# Patient Record
Sex: MALE | Race: BLACK OR AFRICAN AMERICAN | Employment: PART TIME | URBAN - METROPOLITAN AREA
[De-identification: names, ages, dates, MRNs, and addresses within clinical notes are randomized per-mention and may not be internally consistent; named-entity substitution may affect disease eponyms.]

---

## 2022-12-12 ENCOUNTER — HOSPITAL ENCOUNTER (EMERGENCY)
Age: 27
Discharge: HOME | End: 2022-12-12
Attending: EMERGENCY MEDICINE
Payer: MEDICAID

## 2022-12-12 ENCOUNTER — APPOINTMENT (OUTPATIENT)
Dept: ULTRASOUND IMAGING | Age: 27
End: 2022-12-12
Payer: MEDICAID

## 2022-12-12 VITALS
HEIGHT: 78 IN | DIASTOLIC BLOOD PRESSURE: 96 MMHG | HEART RATE: 74 BPM | OXYGEN SATURATION: 95 % | TEMPERATURE: 98.7 F | RESPIRATION RATE: 17 BRPM | BODY MASS INDEX: 30.66 KG/M2 | WEIGHT: 265 LBS | SYSTOLIC BLOOD PRESSURE: 153 MMHG

## 2022-12-12 DIAGNOSIS — R10.9 FLANK PAIN: Primary | ICD-10-CM

## 2022-12-12 DIAGNOSIS — Z86.19 HISTORY OF HEPATITIS A: ICD-10-CM

## 2022-12-12 DIAGNOSIS — G89.29 CHRONIC ABDOMINAL PAIN: ICD-10-CM

## 2022-12-12 DIAGNOSIS — R10.9 CHRONIC ABDOMINAL PAIN: ICD-10-CM

## 2022-12-12 LAB
A/G RATIO: 1.4 (ref 1.1–2.2)
ALBUMIN SERPL-MCNC: 4.6 G/DL (ref 3.4–5)
ALP BLD-CCNC: 105 U/L (ref 40–129)
ALT SERPL-CCNC: 33 U/L (ref 10–40)
ANION GAP SERPL CALCULATED.3IONS-SCNC: 9 MMOL/L (ref 3–16)
AST SERPL-CCNC: 30 U/L (ref 15–37)
BASOPHILS ABSOLUTE: 0.1 K/UL (ref 0–0.2)
BASOPHILS RELATIVE PERCENT: 1.1 %
BILIRUB SERPL-MCNC: 0.3 MG/DL (ref 0–1)
BILIRUBIN URINE: NEGATIVE
BLOOD, URINE: NEGATIVE
BUN BLDV-MCNC: 13 MG/DL (ref 7–20)
CALCIUM SERPL-MCNC: 9.4 MG/DL (ref 8.3–10.6)
CHLORIDE BLD-SCNC: 101 MMOL/L (ref 99–110)
CLARITY: CLEAR
CO2: 27 MMOL/L (ref 21–32)
COLOR: YELLOW
CREAT SERPL-MCNC: 1.2 MG/DL (ref 0.9–1.3)
EOSINOPHILS ABSOLUTE: 0.1 K/UL (ref 0–0.6)
EOSINOPHILS RELATIVE PERCENT: 2.1 %
GFR SERPL CREATININE-BSD FRML MDRD: >60 ML/MIN/{1.73_M2}
GLUCOSE BLD-MCNC: 98 MG/DL (ref 70–99)
GLUCOSE URINE: NEGATIVE MG/DL
HCT VFR BLD CALC: 48.5 % (ref 40.5–52.5)
HEMOGLOBIN: 16.7 G/DL (ref 13.5–17.5)
KETONES, URINE: ABNORMAL MG/DL
LEUKOCYTE ESTERASE, URINE: NEGATIVE
LIPASE: 39 U/L (ref 13–60)
LYMPHOCYTES ABSOLUTE: 2.9 K/UL (ref 1–5.1)
LYMPHOCYTES RELATIVE PERCENT: 52.9 %
MCH RBC QN AUTO: 31.1 PG (ref 26–34)
MCHC RBC AUTO-ENTMCNC: 34.3 G/DL (ref 31–36)
MCV RBC AUTO: 90.5 FL (ref 80–100)
MICROSCOPIC EXAMINATION: ABNORMAL
MONOCYTES ABSOLUTE: 0.3 K/UL (ref 0–1.3)
MONOCYTES RELATIVE PERCENT: 5.9 %
NEUTROPHILS ABSOLUTE: 2.1 K/UL (ref 1.7–7.7)
NEUTROPHILS RELATIVE PERCENT: 38 %
NITRITE, URINE: NEGATIVE
PDW BLD-RTO: 13.3 % (ref 12.4–15.4)
PH UA: 7 (ref 5–8)
PLATELET # BLD: 203 K/UL (ref 135–450)
PMV BLD AUTO: 8.5 FL (ref 5–10.5)
POTASSIUM SERPL-SCNC: 5 MMOL/L (ref 3.5–5.1)
PROTEIN UA: NEGATIVE MG/DL
RBC # BLD: 5.36 M/UL (ref 4.2–5.9)
SODIUM BLD-SCNC: 137 MMOL/L (ref 136–145)
SPECIFIC GRAVITY UA: 1.02 (ref 1–1.03)
TOTAL PROTEIN: 7.9 G/DL (ref 6.4–8.2)
URINE REFLEX TO CULTURE: ABNORMAL
URINE TYPE: ABNORMAL
UROBILINOGEN, URINE: 1 E.U./DL
WBC # BLD: 5.5 K/UL (ref 4–11)

## 2022-12-12 PROCEDURE — 76705 ECHO EXAM OF ABDOMEN: CPT

## 2022-12-12 PROCEDURE — 81003 URINALYSIS AUTO W/O SCOPE: CPT

## 2022-12-12 PROCEDURE — 80053 COMPREHEN METABOLIC PANEL: CPT

## 2022-12-12 PROCEDURE — 99284 EMERGENCY DEPT VISIT MOD MDM: CPT

## 2022-12-12 PROCEDURE — 83690 ASSAY OF LIPASE: CPT

## 2022-12-12 PROCEDURE — 6370000000 HC RX 637 (ALT 250 FOR IP): Performed by: PHYSICIAN ASSISTANT

## 2022-12-12 PROCEDURE — 85025 COMPLETE CBC W/AUTO DIFF WBC: CPT

## 2022-12-12 RX ORDER — DOCUSATE SODIUM 100 MG/1
100 CAPSULE, LIQUID FILLED ORAL 2 TIMES DAILY
Qty: 60 CAPSULE | Refills: 0 | Status: SHIPPED | OUTPATIENT
Start: 2022-12-12 | End: 2023-01-11

## 2022-12-12 RX ORDER — NAPROXEN 500 MG/1
500 TABLET ORAL 2 TIMES DAILY WITH MEALS
Qty: 20 TABLET | Refills: 0 | Status: SHIPPED | OUTPATIENT
Start: 2022-12-12 | End: 2022-12-22

## 2022-12-12 RX ORDER — IBUPROFEN 800 MG/1
800 TABLET ORAL ONCE
Status: COMPLETED | OUTPATIENT
Start: 2022-12-12 | End: 2022-12-12

## 2022-12-12 RX ORDER — POLYETHYLENE GLYCOL 3350 17 G/17G
17 POWDER, FOR SOLUTION ORAL DAILY
Qty: 255 G | Refills: 1 | Status: SHIPPED | OUTPATIENT
Start: 2022-12-12 | End: 2023-01-11

## 2022-12-12 RX ADMIN — IBUPROFEN 800 MG: 800 TABLET, FILM COATED ORAL at 19:53

## 2022-12-12 ASSESSMENT — PAIN SCALES - GENERAL: PAINLEVEL_OUTOF10: 9

## 2022-12-12 ASSESSMENT — PAIN DESCRIPTION - LOCATION: LOCATION: FLANK

## 2022-12-12 ASSESSMENT — LIFESTYLE VARIABLES
HOW OFTEN DO YOU HAVE A DRINK CONTAINING ALCOHOL: NEVER
HOW MANY STANDARD DRINKS CONTAINING ALCOHOL DO YOU HAVE ON A TYPICAL DAY: PATIENT DOES NOT DRINK

## 2022-12-12 ASSESSMENT — PAIN - FUNCTIONAL ASSESSMENT: PAIN_FUNCTIONAL_ASSESSMENT: 0-10

## 2022-12-12 ASSESSMENT — PAIN DESCRIPTION - ORIENTATION: ORIENTATION: RIGHT

## 2022-12-12 NOTE — Clinical Note
Yeni Givens was seen and treated in our emergency department on 12/12/2022. He may return to work on 12/14/2022. If you have any questions or concerns, please don't hesitate to call.       Lakeisha Ward, DO

## 2022-12-12 NOTE — Clinical Note
Kristen Steele was seen and treated in our emergency department on 12/12/2022. He may return to work on 12/14/2022. If you have any questions or concerns, please don't hesitate to call.       Radha Ward, DO

## 2022-12-13 ASSESSMENT — ENCOUNTER SYMPTOMS
COUGH: 0
RHINORRHEA: 0
VOMITING: 0
NAUSEA: 0
DIARRHEA: 0
SHORTNESS OF BREATH: 0
ABDOMINAL PAIN: 1

## 2022-12-13 NOTE — ED PROVIDER NOTES
[facility]        Pt Name: Sherryle Rimes  MRN: [id number]  [place] 1995  Date of evaluation: 12/12/2022  Provider: Osito Iyer PA-C  PCP: Daniela Linn MD  Note Started: 12:01 AM EST 12/13/22           I have seen and evaluated this patient with my supervising physician No att. providers found. CHIEF COMPLAINT       Chief Complaint   Patient presents with    Flank Pain     R sided, radiating into back; patient reports this has been going on for 6 months; when asked what changed tonight, pt responded \"pain got too bad\"; pain rated 9/10       HISTORY OF PRESENT ILLNESS   (Location, Timing/Onset, Context/Setting, Quality, Duration, Modifying Factors, Severity, Associated Signs and Symptoms)  Note limiting factors. Chief Complaint: Right upper quadrant abdominal pain/right flank pain    Niles Ward is a 33 y.o. male who presents to the emergency department today for evaluation for right upper quadrant abdominal pain/right flank pain. Patient states that symptoms have been ongoing for the past 6 months. Patient states that he has had daily pain for the past 6 months, and states that he actually was referred to a primary care physician. Patient states he did have blood work drawn over 2 months ago, however he was not told the results. The patient states that his pain is currently rated as a 9/10 however when I enter the room he is sitting in bed comfortably and in no acute distress looking at his cell phone. Patient otherwise denies any known alleviating or aggravating factors. Patient has no nausea, vomiting or diarrhea. He has no fever or chills. No cough or congestion. He has no chest pain or shortness of breath. He denies any urinary symptoms. He does not take Tylenol.   He reports occasional alcohol use, denies any IV drug use no other complaints    Nursing Notes were all reviewed and agreed with or 1915]   BP Temp Temp Source Heart Rate Resp SpO2 Height Weight   (!) 126/111 98.9 °F (37.2 °C) Oral 88 16 98 % 6' 6\" (1.981 m) 265 lb (120.2 kg)       Physical Exam  Vitals and nursing note reviewed. Constitutional:       Appearance: He is well-developed. He is not diaphoretic. HENT:      Head: Normocephalic and atraumatic. Right Ear: External ear normal.      Left Ear: External ear normal.      Nose: Nose normal.   Eyes:      General:         Right eye: No discharge. Left eye: No discharge. Neck:      Trachea: No tracheal deviation. Cardiovascular:      Rate and Rhythm: Normal rate and regular rhythm. Heart sounds: No murmur heard. Pulmonary:      Effort: Pulmonary effort is normal. No respiratory distress. Breath sounds: Normal breath sounds. No wheezing or rales. Abdominal:      General: Bowel sounds are normal. There is no distension. Palpations: Abdomen is soft. Tenderness: There is abdominal tenderness. There is no right CVA tenderness, left CVA tenderness, guarding or rebound. Comments: Minimal tenderness noted to the right upper quadrant, extending towards right flank. No other flank tenderness, CVA tenderness. No rebound or guarding. No peritoneal signs. Musculoskeletal:         General: Normal range of motion. Cervical back: Normal range of motion and neck supple. Skin:     General: Skin is warm and dry. Neurological:      Mental Status: He is alert and oriented to person, place, and time. Psychiatric:         Behavior: Behavior normal.       DIAGNOSTIC RESULTS   LABS:    Labs Reviewed   URINALYSIS WITH REFLEX TO CULTURE - Abnormal; Notable for the following components:       Result Value    Ketones, Urine TRACE (*)     All other components within normal limits   CBC WITH AUTO DIFFERENTIAL   COMPREHENSIVE METABOLIC PANEL   LIPASE       When ordered only abnormal lab results are displayed.  All other labs were within normal range or not returned as of this dictation. EKG: When ordered, EKG's are interpreted by the Emergency Department Physician in the absence of a cardiologist.  Please see their note for interpretation of EKG. RADIOLOGY:   Non-plain film images such as CT, Ultrasound and MRI are read by the radiologist. Plain radiographic images are visualized and preliminarily interpreted by the ED Provider with the below findings:        Interpretation per the Radiologist below, if available at the time of this note:    [address]   Final Result   No cholelithiasis. No biliary dilation. No evidence of acute cholecystitis. US GALLBLADDER RUQ    Result Date: 12/12/2022  EXAMINATION: RIGHT UPPER QUADRANT ULTRASOUND 12/12/2022 8:38 pm COMPARISON: 03/26/2022 HISTORY: ORDERING SYSTEM PROVIDED HISTORY: RUQ PAIN TECHNOLOGIST PROVIDED HISTORY: Reason for exam:->RUQ PAIN FINDINGS: LIVER:  The liver demonstrates normal echogenicity without evidence of intrahepatic biliary ductal dilatation. BILIARY SYSTEM:  Gallbladder is unremarkable without evidence of pericholecystic fluid, wall thickening or stones. Negative sonographic Otero's sign. Common bile duct is within normal limits measuring 5 mm. RIGHT KIDNEY: The right kidney is grossly unremarkable without evidence of hydronephrosis. PANCREAS:  Visualized portions of the pancreas are unremarkable. OTHER: No evidence of right upper quadrant ascites. No cholelithiasis. No biliary dilation. No evidence of acute cholecystitis.            PROCEDURES   Unless otherwise noted below, none     Procedures    CRITICAL CARE TIME       CONSULTS:  None      EMERGENCY DEPARTMENT COURSE and DIFFERENTIAL DIAGNOSIS/MDM:   Vitals:    Vitals:    12/12/22 1915 12/12/22 1937   BP: (!) 126/111 (!) 153/96   Pulse: 88 74   Resp: 16 17   Temp: 98.9 °F (37.2 °C) 98.7 °F (37.1 °C)   TempSrc: Oral    SpO2: 98% 95%   Weight: 265 lb (120.2 kg)    Height: 6' 6\" (1.981 m)        Patient was given the following medications:  Medications   ibuprofen (ADVIL;MOTRIN) tablet 800 mg (800 mg Oral Given 12/12/22 1953)         Is this patient to be included in the SEP-1 Core Measure due to severe sepsis or septic shock? No   Exclusion criteria - the patient is NOT to be included for SEP-1 Core Measure due to: Infection is not suspected    Briefly, this is a 81-year-old male who presents emergency department today for evaluation for right upper quadrant abdominal pain. Patient states that this has been ongoing for the past 6 months. Patient actually had lab work drawn in September, and was noted to be positive for hepatitis C. Patient states that he has had the same pain for the past several months. No other complaints or symptoms. On exam he does have tenderness noted to the right upper quadrant, extending to the right flank, there is no rebound or guarding, there are no peritoneal signs. CBC shows no evidence of leukocytosis or anemia. CMP is unremarkable, lipase is normal.  Urine shows no evidence of infection or blood. Ultrasound shows no acute process. Patient symptoms today are chronic in nature and have been ongoing for over 6 months, he does test positive for hepatitis C 2 months ago however this is otherwise self-limiting and he otherwise has no other complaints or symptoms, and I do not feel that he needs any further emergent work-up at this time and can be managed as an outpatient. He will be referred to GI for follow-up within the next week. He is to return to the ED for any new or worsening symptoms, patient was understanding is agreeable plan. Able for discharge.    Results for orders placed or performed during the hospital encounter of 12/12/22   CBC with Auto Differential   Result Value Ref Range    WBC 5.5 4.0 - 11.0 K/uL    RBC 5.36 4.20 - 5.90 M/uL    Hemoglobin 16.7 13.5 - 17.5 g/dL    Hematocrit 48.5 40.5 - 52.5 %    MCV 90.5 80.0 - 100.0 fL    MCH 31.1 26.0 - 34.0 pg    MCHC 34.3 31.0 - 36.0 g/dL    RDW 13.3 12.4 - 15.4 %    Platelets 333 484 - 432 K/uL    MPV 8.5 5.0 - 10.5 fL    Neutrophils % 38.0 %    Lymphocytes % 52.9 %    Monocytes % 5.9 %    Eosinophils % 2.1 %    Basophils % 1.1 %    Neutrophils Absolute 2.1 1.7 - 7.7 K/uL    Lymphocytes Absolute 2.9 1.0 - 5.1 K/uL    Monocytes Absolute 0.3 0.0 - 1.3 K/uL    Eosinophils Absolute 0.1 0.0 - 0.6 K/uL    Basophils Absolute 0.1 0.0 - 0.2 K/uL   Comprehensive Metabolic Panel   Result Value Ref Range    Sodium 137 136 - 145 mmol/L    Potassium 5.0 3.5 - 5.1 mmol/L    Chloride 101 99 - 110 mmol/L    CO2 27 21 - 32 mmol/L    Anion Gap 9 3 - 16    Glucose 98 70 - 99 mg/dL    BUN 13 7 - 20 mg/dL    Creatinine 1.2 0.9 - 1.3 mg/dL    Est, Glom Filt Rate >60 >60    Calcium 9.4 8.3 - 10.6 mg/dL    Total Protein 7.9 6.4 - 8.2 g/dL    Albumin 4.6 3.4 - 5.0 g/dL    Albumin/Globulin Ratio 1.4 1.1 - 2.2    Total Bilirubin 0.3 0.0 - 1.0 mg/dL    Alkaline Phosphatase 105 40 - 129 U/L    ALT 33 10 - 40 U/L    AST 30 15 - 37 U/L   Lipase   Result Value Ref Range    Lipase 39.0 13.0 - 60.0 U/L   Urinalysis with Reflex to Culture    Specimen: Urine   Result Value Ref Range    Color, UA Yellow Straw/Yellow    Clarity, UA Clear Clear    Glucose, Ur Negative Negative mg/dL    Bilirubin Urine Negative Negative    Ketones, Urine TRACE (A) Negative mg/dL    Specific Gravity, UA 1.020 1.005 - 1.030    Blood, Urine Negative Negative    pH, UA 7.0 5.0 - 8.0    Protein, UA Negative Negative mg/dL    Urobilinogen, Urine 1.0 <2.0 E.U./dL    Nitrite, Urine Negative Negative    Leukocyte Esterase, Urine Negative Negative    Microscopic Examination Not Indicated     Urine Type NotGiven     Urine Reflex to Culture Not Indicated          I estimate there is LOW risk acute appendicitis, acute cholecystitis, cholangitis, pancreatitis, diverticulitis, perforated viscus, perforated ulcer, colitis, C. diff colitis, ischemic colitis, bowel obstruction, acute dissection, thus I consider the discharge disposition reasonable. Also, there is no evidence or peritonitis, sepsis, or toxicity. Mary Boyce and I have discussed the diagnosis and risks, and we agree with discharging home to follow-up with their primary doctor. We also discussed returning to the Emergency Department immediately if new or worsening symptoms occur. We have discussed the symptoms which are most concerning (e.g., bloody stool, fever, changing or worsening pain, vomiting) that necessitate immediate return. FINAL IMPRESSION      1. Flank pain    2. History of hepatitis A    3.  Chronic abdominal pain          DISPOSITION/PLAN   DISPOSITION Decision To Discharge 12/12/2022 10:19:52 PM      PATIENT REFERRED TO:  [address]  [facility]    Schedule an appointment as soon as possible for a visit in 1 week      [facility] Emergency Department  [address] [facility]  [phone]    As needed, If symptoms worsen    Funmilayo Menendez MD  [address] [place] [address]  [phone]    In 2 days        DISCHARGE MEDICATIONS:  Discharge Medication List as of 12/12/2022 10:32 PM        START taking these medications    Details   naproxen (NAPROSYN) 500 MG tablet Take 1 tablet by mouth 2 times daily (with meals) for 10 days, Disp-20 tablet, R-0Print      polyethylene glycol (GLYCOLAX) 17 GM/SCOOP powder Take 17 g by mouth daily, Disp-255 g, R-1Print      docusate sodium (COLACE) 100 MG capsule Take 1 capsule by mouth 2 times daily, Disp-60 capsule, R-0Print             DISCONTINUED MEDICATIONS:  Discharge Medication List as of 12/12/2022 10:32 PM        STOP taking these medications       meloxicam (MOBIC) 7.5 MG tablet Comments:   Reason for Stopping:                      (Please note that portions of this note were completed with a voice recognition program.  Efforts were made to edit the dictations but occasionally words are

## 2022-12-15 NOTE — ED PROVIDER NOTES
In addition to the advanced practice provider, I personally saw Joanne Perez and performed a substantive portion of the visit including all aspects of the medical decision making. Briefly, this is a 33 y.o. male here for upper abdominal and right flank pain ongoing for the past 6 months. Nothing acutely makes the pain any better or worse, however states pain became more severe today. On exam, patient afebrile and nontoxic. No distress. Heart RRR. Lungs CTAB. Abdomen soft, nondistended, mild tenderness palpation in right upper quadrant and epigastrium. Negative Otero. No lower abdominal tenderness. No rebound, no rigidity, no guarding. No CVA tenderness. Screenings   [place] Coma Scale  Eye Opening: Spontaneous  Best Verbal Response: Oriented  Best Motor Response: Obeys commands  [place] Coma Scale Score: 15      Is this patient to be included in the SEP-1 Core Measure due to severe sepsis or septic shock? No   Exclusion criteria - the patient is NOT to be included for SEP-1 Core Measure due to: Infection is not suspected      MDM    Patient afebrile and nontoxic. No distress. No peritoneal signs on abdominal exam, no focal findings to suggest acute appendicitis and my clinical suspicion is very low. Laboratory work-up is reassuring without evidence of endorgan dysfunction or clinically significant electrolyte derangement. No leukocytosis. Urinalysis bland. Very low suspicion clinically for ureteral stone. Right upper quadrant ultrasound demonstrates no acute abnormalities, no findings to suggest acute cholecystitis. Patient's pain ongoing for months, felt safe for discharge to self-care with close PCP follow-up. Patient is agreeable with plan and feels comfortable returning for home. Work excuse provided as per request.  Strict immediate return precautions were discussed. I Dr. Manuel Martin am the primary clinician of record. Patient Referrals:   [address] [place] [address]  Place  [address] [facility] [address]    Schedule an appointment as soon as possible for a visit in 1 week      [facility] Emergency Department  [address] [facility]  [phone]    As needed, If symptoms worsen    Melisa Albert MD  [address] [facility]  [phone]    In 2 days        Discharge Medications:  Discharge Medication List as of 12/12/2022 10:32 PM        START taking these medications    Details   naproxen (NAPROSYN) 500 MG tablet Take 1 tablet by mouth 2 times daily (with meals) for 10 days, Disp-20 tablet, R-0Print      polyethylene glycol (GLYCOLAX) 17 GM/SCOOP powder Take 17 g by mouth daily, Disp-255 g, R-1Print      docusate sodium (COLACE) 100 MG capsule Take 1 capsule by mouth 2 times daily, Disp-60 capsule, R-0Print             FINAL IMPRESSION  1. Flank pain    2. History of hepatitis A    3. Chronic abdominal pain        Blood pressure (!) 153/96, pulse 74, temperature 98.7 °F (37.1 °C), resp. rate 17, height 6' 6\" (1.981 m), weight 265 lb (120.2 kg), SpO2 95 %. For further details of [facility] emergency department encounter, please see documentation by advanced practice provider, MISHEL Carr.     Anna Ibanez DO (electronically signed)  Attending Emergency Physician       Anna Ibanez DO  12/15/22 [id number]

## 2023-01-20 ENCOUNTER — OFFICE VISIT (OUTPATIENT)
Dept: INTERNAL MEDICINE CLINIC | Age: 28
End: 2023-01-20

## 2023-01-20 VITALS
BODY MASS INDEX: 32.21 KG/M2 | HEIGHT: 78 IN | WEIGHT: 278.4 LBS | OXYGEN SATURATION: 97 % | DIASTOLIC BLOOD PRESSURE: 74 MMHG | SYSTOLIC BLOOD PRESSURE: 128 MMHG | HEART RATE: 68 BPM

## 2023-01-20 DIAGNOSIS — R73.9 HYPERGLYCEMIA: ICD-10-CM

## 2023-01-20 DIAGNOSIS — Z00.00 PREVENTATIVE HEALTH CARE: ICD-10-CM

## 2023-01-20 DIAGNOSIS — Z00.00 PREVENTATIVE HEALTH CARE: Primary | ICD-10-CM

## 2023-01-20 DIAGNOSIS — R10.13 DYSPEPSIA: ICD-10-CM

## 2023-01-20 DIAGNOSIS — Z13.220 SCREENING FOR CHOLESTEROL LEVEL: ICD-10-CM

## 2023-01-20 LAB
A/G RATIO: 1.5 (ref 1.1–2.2)
ALBUMIN SERPL-MCNC: 4.6 G/DL (ref 3.4–5)
ALP BLD-CCNC: 107 U/L (ref 40–129)
ALT SERPL-CCNC: 57 U/L (ref 10–40)
ANION GAP SERPL CALCULATED.3IONS-SCNC: 14 MMOL/L (ref 3–16)
AST SERPL-CCNC: 36 U/L (ref 15–37)
BASOPHILS ABSOLUTE: 0 K/UL (ref 0–0.2)
BASOPHILS RELATIVE PERCENT: 0.9 %
BILIRUB SERPL-MCNC: 0.3 MG/DL (ref 0–1)
BUN BLDV-MCNC: 11 MG/DL (ref 7–20)
CALCIUM SERPL-MCNC: 9.7 MG/DL (ref 8.3–10.6)
CHLORIDE BLD-SCNC: 102 MMOL/L (ref 99–110)
CHOLESTEROL, TOTAL: 205 MG/DL (ref 0–199)
CO2: 25 MMOL/L (ref 21–32)
CREAT SERPL-MCNC: 1 MG/DL (ref 0.9–1.3)
EOSINOPHILS ABSOLUTE: 0.1 K/UL (ref 0–0.6)
EOSINOPHILS RELATIVE PERCENT: 3 %
GFR SERPL CREATININE-BSD FRML MDRD: >60 ML/MIN/{1.73_M2}
GLUCOSE BLD-MCNC: 102 MG/DL (ref 70–99)
HCT VFR BLD CALC: 49.1 % (ref 40.5–52.5)
HDLC SERPL-MCNC: 42 MG/DL (ref 40–60)
HEMOGLOBIN: 16.4 G/DL (ref 13.5–17.5)
LDL CHOLESTEROL CALCULATED: 135 MG/DL
LYMPHOCYTES ABSOLUTE: 2 K/UL (ref 1–5.1)
LYMPHOCYTES RELATIVE PERCENT: 55 %
MCH RBC QN AUTO: 30.7 PG (ref 26–34)
MCHC RBC AUTO-ENTMCNC: 33.5 G/DL (ref 31–36)
MCV RBC AUTO: 91.5 FL (ref 80–100)
MONOCYTES ABSOLUTE: 0.2 K/UL (ref 0–1.3)
MONOCYTES RELATIVE PERCENT: 5.9 %
NEUTROPHILS ABSOLUTE: 1.3 K/UL (ref 1.7–7.7)
NEUTROPHILS RELATIVE PERCENT: 35.2 %
PDW BLD-RTO: 13.5 % (ref 12.4–15.4)
PLATELET # BLD: 197 K/UL (ref 135–450)
PMV BLD AUTO: 9.5 FL (ref 5–10.5)
POTASSIUM SERPL-SCNC: 4.4 MMOL/L (ref 3.5–5.1)
RBC # BLD: 5.36 M/UL (ref 4.2–5.9)
SODIUM BLD-SCNC: 141 MMOL/L (ref 136–145)
TOTAL PROTEIN: 7.6 G/DL (ref 6.4–8.2)
TRIGL SERPL-MCNC: 142 MG/DL (ref 0–150)
VLDLC SERPL CALC-MCNC: 28 MG/DL
WBC # BLD: 3.7 K/UL (ref 4–11)

## 2023-01-20 RX ORDER — OMEPRAZOLE 40 MG/1
40 CAPSULE, DELAYED RELEASE ORAL
Qty: 30 CAPSULE | Refills: 2 | Status: SHIPPED | OUTPATIENT
Start: 2023-01-20

## 2023-01-20 ASSESSMENT — ENCOUNTER SYMPTOMS
CHEST TIGHTNESS: 0
DIARRHEA: 0
VOMITING: 0
SHORTNESS OF BREATH: 0
SINUS PAIN: 0
BLOOD IN STOOL: 0
WHEEZING: 0
COLOR CHANGE: 0
CONSTIPATION: 0
NAUSEA: 0
BELCHING: 0
ABDOMINAL PAIN: 1
COUGH: 0

## 2023-01-20 ASSESSMENT — PATIENT HEALTH QUESTIONNAIRE - PHQ9
2. FEELING DOWN, DEPRESSED OR HOPELESS: 0
SUM OF ALL RESPONSES TO PHQ QUESTIONS 1-9: 0
1. LITTLE INTEREST OR PLEASURE IN DOING THINGS: 0
SUM OF ALL RESPONSES TO PHQ QUESTIONS 1-9: 0
SUM OF ALL RESPONSES TO PHQ QUESTIONS 1-9: 0
SUM OF ALL RESPONSES TO PHQ9 QUESTIONS 1 & 2: 0
SUM OF ALL RESPONSES TO PHQ QUESTIONS 1-9: 0

## 2023-01-20 NOTE — PROGRESS NOTES
Niles Joseph (:  1995) is a 33 y.o. male,Established patient, here for evaluation of the following chief complaint(s):  Abdominal Pain (R side )         ASSESSMENT/PLAN:  1. Preventative health care  -     CBC with Auto Differential; Future  2. Dyspepsia  -     omeprazole (PRILOSEC) 40 MG delayed release capsule; Take 1 capsule by mouth every morning (before breakfast), Disp-30 capsule, R-2Normal  -     H. Pylori Breath Test, Adult; Future  -     [facility] - Isidoro Barrientos MD, Gastroenterology (ERCP & EUS), [facility]  -     CBC with Auto Differential; Future  -     Comprehensive Metabolic Panel; Future  3. Screening for cholesterol level  -     Lipid Panel; Future  4. Hyperglycemia  -     Hemoglobin A1C; Future  Reviewed with patient his presentation again he is having abdominal pain again in the right upper quadrant is in the evaluation 6 months ago did not show any abnormality on the ultrasound and he did respond well to the acid suppressant after few days of using it but once he was done with the medicine and stopped it the pain was dormant until recently when he started acting up again patient denies using any over-the-counter meds pain medication and he drinks occasionally only    At this time    Restart the patient on an acid suppressant check him for H. pylori and refer him to have an upper endoscopy for further evaluation  The standpoint the patient did have borderline sugar we will get a check his A1c we will get a check his cholesterol again today and otherwise encouraged him to be active and eat healthy and monitor him clinically  Return in about 6 weeks (around 3/3/2023).          Subjective   SUBJECTIVE/OBJECTIVE:    Lab Review   Lab Results   Component Value Date/Time     2022 07:52 PM     2022 11:49 AM     2022 09:56 AM    K 5.0 2022 07:52 PM    K 4.3 2022 11:49 AM    K 4.3 2022 09:56 AM    K 4.1 2021 12:18 PM    CO2 27 12/12/2022 07:52 PM    CO2 27 09/23/2022 11:49 AM    CO2 28 07/27/2022 09:56 AM    BUN 13 12/12/2022 07:52 PM    BUN 9 09/23/2022 11:49 AM    BUN 10 07/27/2022 09:56 AM    CREATININE 1.2 12/12/2022 07:52 PM    CREATININE 1.0 09/23/2022 11:49 AM    CREATININE 0.9 07/27/2022 09:56 AM    GLUCOSE 98 12/12/2022 07:52 PM    GLUCOSE 116 09/23/2022 11:49 AM    GLUCOSE 100 07/27/2022 09:56 AM    CALCIUM 9.4 12/12/2022 07:52 PM    CALCIUM 9.3 09/23/2022 11:49 AM    CALCIUM 9.5 07/27/2022 09:56 AM     Lab Results   Component Value Date/Time    WBC 5.5 12/12/2022 07:52 PM    WBC 2.7 07/27/2022 09:56 AM    WBC 3.5 03/26/2022 11:05 AM    HGB 16.7 12/12/2022 07:52 PM    HGB 15.4 07/27/2022 09:56 AM    HGB 15.1 03/26/2022 11:05 AM    HCT 48.5 12/12/2022 07:52 PM    HCT 44.7 07/27/2022 09:56 AM    HCT 44.7 03/26/2022 11:05 AM    MCV 90.5 12/12/2022 07:52 PM    MCV 90.0 07/27/2022 09:56 AM    MCV 89.9 03/26/2022 11:05 AM     12/12/2022 07:52 PM     07/27/2022 09:56 AM     03/26/2022 11:05 AM     No results found for: CHOL, TRIG, HDL, LDLDIRECT    Vitals 1/20/2023 12/12/2022 55/31/3104   SYSTOLIC 134 557 291   DIASTOLIC 74 96 495   Pulse 68 74 88   Temp - 98.7 98.9   Resp - 17 16   SpO2 97 95 98   Weight 278 lb 6.4 oz - 265 lb   Height 6' 6\" - 6' 6\"   Body mass index 32.17 kg/m2 - 30.62 kg/m2   Pain Level - 9 -   Some recent data might be hidden       Abdominal Pain  This is a recurrent problem. The current episode started more than 1 month ago. The pain is located in the RUQ. The pain is at a severity of 6/10. Associated symptoms include dysuria. Pertinent negatives include no anorexia, arthralgias, belching, constipation, diarrhea, frequency, headaches, melena, myalgias, nausea or vomiting. Review of Systems   Constitutional:  Negative for activity change, appetite change, fatigue and unexpected weight change. HENT:  Negative for congestion, ear pain and sinus pain.     Respiratory:  Negative for cough, chest tightness, shortness of breath and wheezing. Cardiovascular:  Negative for chest pain and palpitations. Gastrointestinal:  Positive for abdominal pain. Negative for anorexia, blood in stool, constipation, diarrhea, melena, nausea and vomiting. Endocrine: Negative for cold intolerance, heat intolerance and polyuria. Genitourinary:  Positive for dysuria. Negative for frequency and urgency. Musculoskeletal:  Negative for arthralgias and myalgias. Skin:  Negative for color change and rash. Neurological:  Negative for weakness and headaches. Hematological:  Negative for adenopathy. Does not bruise/bleed easily. Psychiatric/Behavioral:  Negative for agitation, dysphoric mood and sleep disturbance. Objective   Physical Exam  Vitals and nursing note reviewed. Constitutional:       General: He is not in acute distress. Appearance: Normal appearance. HENT:      Head: Normocephalic and atraumatic. Right Ear: Tympanic membrane normal.      Left Ear: Tympanic membrane normal.      Nose: Nose normal.   Eyes:      Extraocular Movements: Extraocular movements intact. Conjunctiva/sclera: Conjunctivae normal.      Pupils: Pupils are equal, round, and reactive to light. Neck:      Vascular: No carotid bruit. Cardiovascular:      Rate and Rhythm: Normal rate and regular rhythm. Pulses: Normal pulses. Heart sounds: No murmur heard. Pulmonary:      Effort: Pulmonary effort is normal. No respiratory distress. Breath sounds: Normal breath sounds. Abdominal:      General: Abdomen is flat. Bowel sounds are normal. There is no distension. Palpations: Abdomen is soft. Tenderness: There is no abdominal tenderness. Musculoskeletal:         General: No swelling, tenderness or deformity. Cervical back: Normal range of motion and neck supple. No rigidity or tenderness. Right lower leg: No edema. Left lower leg: No edema.    Lymphadenopathy: Cervical: No cervical adenopathy. Skin:     Coloration: Skin is not jaundiced. Findings: No bruising, erythema or lesion. Neurological:      General: No focal deficit present. Mental Status: He is alert and oriented to person, place, and time. Cranial Nerves: No cranial nerve deficit. Motor: No weakness. Gait: Gait normal.          This dictation was generated by voice recognition computer software. Although all attempts are made to edit the dictation for accuracy, there may be errors in the transcription that are not intended. An electronic signature was used to authenticate this note.     --Mandi Vazquez MD

## 2023-01-21 LAB
ESTIMATED AVERAGE GLUCOSE: 111.2 MG/DL
HBA1C MFR BLD: 5.5 %

## 2023-11-13 ENCOUNTER — HOSPITAL ENCOUNTER (EMERGENCY)
Age: 28
Discharge: HOME | End: 2023-11-13
Attending: EMERGENCY MEDICINE
Payer: MEDICAID

## 2023-11-13 ENCOUNTER — APPOINTMENT (OUTPATIENT)
Dept: CT IMAGING | Age: 28
End: 2023-11-13
Payer: MEDICAID

## 2023-11-13 VITALS
OXYGEN SATURATION: 97 % | HEIGHT: 78 IN | DIASTOLIC BLOOD PRESSURE: 84 MMHG | BODY MASS INDEX: 30.08 KG/M2 | HEART RATE: 70 BPM | TEMPERATURE: 99.1 F | RESPIRATION RATE: 16 BRPM | SYSTOLIC BLOOD PRESSURE: 134 MMHG | WEIGHT: 260 LBS

## 2023-11-13 DIAGNOSIS — R10.31 RIGHT LOWER QUADRANT ABDOMINAL PAIN: Primary | ICD-10-CM

## 2023-11-13 LAB
ALBUMIN SERPL-MCNC: 4.5 G/DL (ref 3.4–5)
ALBUMIN/GLOB SERPL: 1.5 {RATIO} (ref 1.1–2.2)
ALP SERPL-CCNC: 88 U/L (ref 40–129)
ALT SERPL-CCNC: 41 U/L (ref 10–40)
ANION GAP SERPL CALCULATED.3IONS-SCNC: 8 MMOL/L (ref 3–16)
AST SERPL-CCNC: 27 U/L (ref 15–37)
BASOPHILS # BLD: 0.1 K/UL (ref 0–0.2)
BASOPHILS NFR BLD: 1 %
BILIRUB SERPL-MCNC: 0.3 MG/DL (ref 0–1)
BILIRUB UR QL STRIP.AUTO: NEGATIVE
BUN SERPL-MCNC: 11 MG/DL (ref 7–20)
CALCIUM SERPL-MCNC: 9.5 MG/DL (ref 8.3–10.6)
CHLORIDE SERPL-SCNC: 101 MMOL/L (ref 99–110)
CLARITY UR: CLEAR
CO2 SERPL-SCNC: 27 MMOL/L (ref 21–32)
COLOR UR: YELLOW
CREAT SERPL-MCNC: 1.2 MG/DL (ref 0.9–1.3)
DEPRECATED RDW RBC AUTO: 13.9 % (ref 12.4–15.4)
EOSINOPHIL # BLD: 0.3 K/UL (ref 0–0.6)
EOSINOPHIL NFR BLD: 6.5 %
GFR SERPLBLD CREATININE-BSD FMLA CKD-EPI: >60 ML/MIN/{1.73_M2}
GLUCOSE SERPL-MCNC: 104 MG/DL (ref 70–99)
GLUCOSE UR STRIP.AUTO-MCNC: NEGATIVE MG/DL
HCT VFR BLD AUTO: 47 % (ref 40.5–52.5)
HGB BLD-MCNC: 15.6 G/DL (ref 13.5–17.5)
HGB UR QL STRIP.AUTO: NEGATIVE
KETONES UR STRIP.AUTO-MCNC: NEGATIVE MG/DL
LACTATE BLDV-SCNC: 1.7 MMOL/L (ref 0.4–2)
LEUKOCYTE ESTERASE UR QL STRIP.AUTO: NEGATIVE
LIPASE SERPL-CCNC: 44 U/L (ref 13–60)
LYMPHOCYTES # BLD: 2.2 K/UL (ref 1–5.1)
LYMPHOCYTES NFR BLD: 44.9 %
MCH RBC QN AUTO: 30.6 PG (ref 26–34)
MCHC RBC AUTO-ENTMCNC: 33.3 G/DL (ref 31–36)
MCV RBC AUTO: 92 FL (ref 80–100)
MONOCYTES # BLD: 0.4 K/UL (ref 0–1.3)
MONOCYTES NFR BLD: 8.1 %
NEUTROPHILS # BLD: 1.9 K/UL (ref 1.7–7.7)
NEUTROPHILS NFR BLD: 39.5 %
NITRITE UR QL STRIP.AUTO: NEGATIVE
PH UR STRIP.AUTO: 6.5 [PH] (ref 5–8)
PLATELET # BLD AUTO: 230 K/UL (ref 135–450)
PLATELET BLD QL SMEAR: ADEQUATE
PMV BLD AUTO: 9.1 FL (ref 5–10.5)
POTASSIUM SERPL-SCNC: 4.4 MMOL/L (ref 3.5–5.1)
PROT SERPL-MCNC: 7.5 G/DL (ref 6.4–8.2)
PROT UR STRIP.AUTO-MCNC: NEGATIVE MG/DL
RBC # BLD AUTO: 5.11 M/UL (ref 4.2–5.9)
SLIDE REVIEW: NORMAL
SODIUM SERPL-SCNC: 136 MMOL/L (ref 136–145)
SP GR UR STRIP.AUTO: 1.02 (ref 1–1.03)
UA COMPLETE W REFLEX CULTURE PNL UR: NORMAL
UA DIPSTICK W REFLEX MICRO PNL UR: NORMAL
URN SPEC COLLECT METH UR: NORMAL
UROBILINOGEN UR STRIP-ACNC: 1 E.U./DL
WBC # BLD AUTO: 4.8 K/UL (ref 4–11)

## 2023-11-13 PROCEDURE — 99285 EMERGENCY DEPT VISIT HI MDM: CPT

## 2023-11-13 PROCEDURE — 80053 COMPREHEN METABOLIC PANEL: CPT

## 2023-11-13 PROCEDURE — 85025 COMPLETE CBC W/AUTO DIFF WBC: CPT

## 2023-11-13 PROCEDURE — 6360000004 HC RX CONTRAST MEDICATION: Performed by: PHYSICIAN ASSISTANT

## 2023-11-13 PROCEDURE — 83690 ASSAY OF LIPASE: CPT

## 2023-11-13 PROCEDURE — 81003 URINALYSIS AUTO W/O SCOPE: CPT

## 2023-11-13 PROCEDURE — 83605 ASSAY OF LACTIC ACID: CPT

## 2023-11-13 PROCEDURE — 6370000000 HC RX 637 (ALT 250 FOR IP): Performed by: PHYSICIAN ASSISTANT

## 2023-11-13 PROCEDURE — 74177 CT ABD & PELVIS W/CONTRAST: CPT

## 2023-11-13 RX ORDER — PANTOPRAZOLE SODIUM 40 MG/1
40 TABLET, DELAYED RELEASE ORAL ONCE
Status: COMPLETED | OUTPATIENT
Start: 2023-11-13 | End: 2023-11-13

## 2023-11-13 RX ORDER — ONDANSETRON 4 MG/1
4 TABLET, ORALLY DISINTEGRATING ORAL ONCE
Status: COMPLETED | OUTPATIENT
Start: 2023-11-13 | End: 2023-11-13

## 2023-11-13 RX ORDER — DICYCLOMINE HYDROCHLORIDE 10 MG/1
10 CAPSULE ORAL ONCE
Status: COMPLETED | OUTPATIENT
Start: 2023-11-13 | End: 2023-11-13

## 2023-11-13 RX ADMIN — PANTOPRAZOLE SODIUM 40 MG: 40 TABLET, DELAYED RELEASE ORAL at 16:52

## 2023-11-13 RX ADMIN — ONDANSETRON 4 MG: 4 TABLET, ORALLY DISINTEGRATING ORAL at 16:52

## 2023-11-13 RX ADMIN — DICYCLOMINE HYDROCHLORIDE 10 MG: 10 CAPSULE ORAL at 16:52

## 2023-11-13 RX ADMIN — IOPAMIDOL 75 ML: 755 INJECTION, SOLUTION INTRAVENOUS at 17:51

## 2023-11-13 ASSESSMENT — ENCOUNTER SYMPTOMS
BACK PAIN: 0
COLOR CHANGE: 0
COUGH: 0
CHEST TIGHTNESS: 0
SHORTNESS OF BREATH: 0
ABDOMINAL PAIN: 1
DIARRHEA: 0
ABDOMINAL DISTENTION: 0
RESPIRATORY NEGATIVE: 1
CONSTIPATION: 0
NAUSEA: 0
VOMITING: 0

## 2023-11-13 ASSESSMENT — PAIN - FUNCTIONAL ASSESSMENT: PAIN_FUNCTIONAL_ASSESSMENT: 0-10

## 2023-11-13 ASSESSMENT — PAIN DESCRIPTION - LOCATION: LOCATION: ABDOMEN

## 2023-11-13 ASSESSMENT — PAIN DESCRIPTION - DESCRIPTORS: DESCRIPTORS: DISCOMFORT

## 2023-11-13 ASSESSMENT — PAIN SCALES - GENERAL: PAINLEVEL_OUTOF10: 4

## 2023-11-13 NOTE — ED PROVIDER NOTES
[facility]        Pt Name: Matt Leon  MRN: [id number]  [address] [place] 1995  Date of evaluation: 11/13/2023  Provider: MISHEL Win  PCP: Kendy Sultana MD  Note Started: 6:22 PM EST 11/13/23       I have seen and evaluated this patient with my supervising physician Sonu Wylie MD.      [address]       Chief Complaint   Patient presents with    Abdominal Pain     Abdominal pain x 2 years. Has seen GI but they found nothing. HISTORY OF PRESENT ILLNESS: 1 or more Elements     History From: Patient  Limitations to history : None    Niles Dias is a 33 y.o. male with no significant past medical history who presents ED with complaint of abdominal pain. He complains of right-sided abdominal pain that he reports of ongoing intermittently for the past 2 years. He states he has had extensive work-up in the past including evaluation by GI/EGD with no definitive etiology to his symptoms. He is concerned because over the past 2 days he feels like symptoms have worsened. He rates pain as a 4/10 to his right lower abdomen. He denies nausea, vomiting, urinary symptoms or changes in bowel movements. He denies any testicular pain or swelling. Nuys any penile discharge. He denies fever or chills. Denies any rashes or lesions. Denies chest pain, shortness of breath or cough. He denies any decreased oral intake. Denies any surgeries to his abdomen in the past.    Nursing Notes were all reviewed and agreed with or any disagreements were addressed in the HPI. REVIEW OF SYSTEMS :      Review of Systems   Constitutional:  Negative for activity change, appetite change, chills, diaphoresis, fatigue and fever. Respiratory: Negative. Negative for cough, chest tightness and shortness of breath. Cardiovascular: Negative. Negative for chest pain, palpitations and leg swelling.    Gastrointestinal:

## 2023-11-14 NOTE — DISCHARGE INSTRUCTIONS
Take Tylenol Motrin for pain. I have given you a referral to Dr. Vidya Gonzales our gastroenterologist.  Call tomorrow for an appointment.

## 2023-11-14 NOTE — ED PROVIDER NOTES
This patient was seen by the Mid-Level Provider. I have seen and examined the patient, agree with the workup, evaluation, management and diagnosis. Care plan has been discussed. My assessment reveals a 81-year-old male who presents with abdominal pain. This is a very pleasant 81-year-old male who presents with right lower quadrant abdominal pain he has had for the last several years. He states it has been worse over the last several days. He denies any bowel or bladder complaints. He denies any fevers or chills. The patient apparently has seen a GI doctor in the past and has received an upper GI. He has not had a colonoscopy per the patient. Radiology results:    CT ABDOMEN PELVIS W IV CONTRAST Additional Contrast? None   Preliminary Result   1. No acute bowel pathology. No evidence of appendicitis. Mild fecalization   of small bowel contents suggesting slow transit. Mild diffuse retained   colonic stool. 2. Otherwise unremarkable CT of the abdomen and pelvis. LABS:    Labs Reviewed   COMPREHENSIVE METABOLIC PANEL W/ REFLEX TO MG FOR LOW K - Abnormal; Notable for the following components:       Result Value    Glucose 104 (*)     ALT 41 (*)     All other components within normal limits   URINALYSIS WITH REFLEX TO CULTURE   CBC WITH AUTO DIFFERENTIAL   LIPASE   LACTIC ACID               Exam:    Well-nourished male sitting comfortably in no acute distress. Abdominal exam was unremarkable and benign. There is no pain or McBurney's point. Positive bowel sounds. Medical decision makin-year-old male who presents with abdominal pain. This appears to be acute on chronic abdominal pain. The pain is lower. Our work-up today is unremarkable and normal including chemistry profile and CBC. A CT of the abdomen and pelvis shows some constipation but nothing acute.   The patient will be discharged with referral to our gastroenterologist.  I told him he might need a colonoscopy

## 2024-03-01 ENCOUNTER — HOSPITAL ENCOUNTER (EMERGENCY)
Age: 29
Discharge: HOME | End: 2024-03-01
Payer: MEDICAID

## 2024-03-01 ENCOUNTER — APPOINTMENT (OUTPATIENT)
Dept: GENERAL RADIOLOGY | Age: 29
End: 2024-03-01
Payer: MEDICAID

## 2024-03-01 VITALS
HEART RATE: 85 BPM | RESPIRATION RATE: 20 BRPM | TEMPERATURE: 97.5 F | DIASTOLIC BLOOD PRESSURE: 89 MMHG | SYSTOLIC BLOOD PRESSURE: 147 MMHG | OXYGEN SATURATION: 96 % | WEIGHT: 264 LBS | BODY MASS INDEX: 30.55 KG/M2 | HEIGHT: 78 IN

## 2024-03-01 DIAGNOSIS — J06.9 UPPER RESPIRATORY TRACT INFECTION, UNSPECIFIED TYPE: Primary | ICD-10-CM

## 2024-03-01 LAB
FLUAV RNA RESP QL NAA+PROBE: NOT DETECTED
FLUBV RNA RESP QL NAA+PROBE: NOT DETECTED
SARS-COV-2 RNA RESP QL NAA+PROBE: NOT DETECTED

## 2024-03-01 PROCEDURE — 6370000000 HC RX 637 (ALT 250 FOR IP): Performed by: PHYSICIAN ASSISTANT

## 2024-03-01 PROCEDURE — 99284 EMERGENCY DEPT VISIT MOD MDM: CPT

## 2024-03-01 PROCEDURE — 87636 SARSCOV2 & INF A&B AMP PRB: CPT

## 2024-03-01 PROCEDURE — 71046 X-RAY EXAM CHEST 2 VIEWS: CPT

## 2024-03-01 RX ORDER — IBUPROFEN 400 MG/1
400 TABLET ORAL ONCE
Status: COMPLETED | OUTPATIENT
Start: 2024-03-01 | End: 2024-03-01

## 2024-03-01 RX ORDER — IBUPROFEN 600 MG/1
600 TABLET ORAL 3 TIMES DAILY PRN
Qty: 30 TABLET | Refills: 0 | Status: SHIPPED | OUTPATIENT
Start: 2024-03-01

## 2024-03-01 RX ORDER — FLUTICASONE PROPIONATE 50 MCG
1 SPRAY, SUSPENSION (ML) NASAL DAILY
Qty: 16 G | Refills: 0 | Status: SHIPPED | OUTPATIENT
Start: 2024-03-01 | End: 2024-03-11

## 2024-03-01 RX ORDER — PSEUDOEPHEDRINE HYDROCHLORIDE 60 MG/1
60 TABLET, FILM COATED ORAL EVERY 6 HOURS PRN
Qty: 28 TABLET | Refills: 0 | Status: SHIPPED | OUTPATIENT
Start: 2024-03-01

## 2024-03-01 RX ADMIN — IBUPROFEN 400 MG: 400 TABLET, FILM COATED ORAL at 13:40

## 2024-03-01 ASSESSMENT — ENCOUNTER SYMPTOMS
CONSTIPATION: 0
SHORTNESS OF BREATH: 0
EYE PAIN: 0
BACK PAIN: 0
ABDOMINAL PAIN: 0
VOMITING: 0
COUGH: 1
RHINORRHEA: 0
DIARRHEA: 0
SORE THROAT: 0
NAUSEA: 0

## 2024-03-01 ASSESSMENT — PAIN DESCRIPTION - LOCATION: LOCATION: HEAD

## 2024-03-01 ASSESSMENT — PAIN DESCRIPTION - ORIENTATION: ORIENTATION: RIGHT;LEFT

## 2024-03-01 ASSESSMENT — PAIN - FUNCTIONAL ASSESSMENT
PAIN_FUNCTIONAL_ASSESSMENT: 0-10
PAIN_FUNCTIONAL_ASSESSMENT: ACTIVITIES ARE NOT PREVENTED

## 2024-03-01 ASSESSMENT — PAIN DESCRIPTION - PAIN TYPE: TYPE: ACUTE PAIN

## 2024-03-01 ASSESSMENT — PAIN SCALES - GENERAL
PAINLEVEL_OUTOF10: 7
PAINLEVEL_OUTOF10: 7

## 2024-03-01 ASSESSMENT — PAIN DESCRIPTION - FREQUENCY: FREQUENCY: CONTINUOUS

## 2024-03-01 ASSESSMENT — PAIN DESCRIPTION - ONSET: ONSET: ON-GOING

## 2024-03-01 ASSESSMENT — PAIN DESCRIPTION - DESCRIPTORS: DESCRIPTORS: ACHING

## 2024-03-01 NOTE — ED PROVIDER NOTES
6\")       Patient was given the following medications:  Medications   ibuprofen (ADVIL;MOTRIN) tablet 400 mg (400 mg Oral Given 3/1/24 1340)             Is this patient to be included in the SEP-1 Core Measure due to severe sepsis or septic shock?   No   Exclusion criteria - the patient is NOT to be included for SEP-1 Core Measure due to:  Viral etiology found or highly suspected (including COVID-19) without concomitant bacterial infection    CONSULTS: (Who and What was discussed)  None  Discussion with Other Profesionals : None    Social Determinants : None    Records Reviewed : None    CC/HPI Summary, DDx, ED Course, and Reassessment: Briefly this is a 28-year-old male who presents emergency room due to headache, congestion for the past week.  Patient states he has not taken any medication for symptoms yet.  Denies any known sick contacts.    He does have some rhinorrhea but otherwise appears well in no acute distress.  Posterior pharynx is normal without any signs of significant erythema or edema.  Lungs are clear to auscultation heart has regular rate and rhythm.  There is no cervical lymphadenopathy.    Differential diagnosis includes COVID-19, influenza, URI, bronchitis, pneumonia.    COVID-19 and influenza testing were negative today.  Chest x-ray interpreted by myself here in the emergency room did not reveal any acute cardiopulmonary abnormalities.  Radiology overread also does not show any acute cardiopulmonary abnormalities.    Patient be discharged with medication to help with the symptoms and to follow-up primary care doctor.  Most likely patient suffering from a viral URI and should improve over the next 1 to 2 weeks.    I did consider laboratory testing however he is afebrile nontachycardic.    Appropriate for outpatient management        I am the Primary Clinician of Record.    FINAL IMPRESSION      1. Upper respiratory tract infection, unspecified type          DISPOSITION/PLAN     DISPOSITION  Decision To Discharge 03/01/2024 02:19:59 PM      PATIENT REFERRED TO:  Varun Chung MD  [address] [address]  [phone]    Schedule an appointment as soon as possible for a visit in 1 week  [facility]  [address] [address]  [phone]    As needed, If symptoms worsen      DISCHARGE MEDICATIONS:  New Prescriptions    FLUTICASONE (FLONASE) 50 MCG/ACT NASAL SPRAY    1 spray by Nasal route daily for 10 days    IBUPROFEN (ADVIL;MOTRIN) 600 MG TABLET    Take 1 tablet by mouth 3 times daily as needed for Pain    PSEUDOEPHEDRINE (SUDAFED) 60 MG TABLET    Take 1 tablet by mouth every 6 hours as needed for Congestion       DISCONTINUED MEDICATIONS:  Discontinued Medications    No medications on file              (Please note that portions of this note were completed with a voice recognition program.  Efforts were made to edit the dictations but occasionally words are mis-transcribed.)    MISHEL Coleman (electronically signed)            Johnson Holt PA  03/01/24 3103